# Patient Record
Sex: FEMALE | Race: WHITE | NOT HISPANIC OR LATINO | URBAN - METROPOLITAN AREA
[De-identification: names, ages, dates, MRNs, and addresses within clinical notes are randomized per-mention and may not be internally consistent; named-entity substitution may affect disease eponyms.]

---

## 2023-05-12 LAB
CLUE CELLS: NORMAL
NUGENT SCORE: 2
YEAST: NORMAL

## 2023-07-15 LAB
CLUE CELLS: NORMAL
NUGENT SCORE: 3
YEAST: NORMAL

## 2023-08-30 LAB
ERYTHROCYTE DISTRIBUTION WIDTH (RATIO) BY AUTOMATED COUNT: 12 % (ref 11.5–14.5)
ERYTHROCYTE MEAN CORPUSCULAR HEMOGLOBIN CONCENTRATION (G/DL) BY AUTOMATED: 32.5 G/DL (ref 32–36)
ERYTHROCYTE MEAN CORPUSCULAR VOLUME (FL) BY AUTOMATED COUNT: 94 FL (ref 80–100)
ERYTHROCYTES (10*6/UL) IN BLOOD BY AUTOMATED COUNT: 4.18 X10E12/L (ref 4–5.2)
HEMATOCRIT (%) IN BLOOD BY AUTOMATED COUNT: 39.1 % (ref 36–46)
HEMOGLOBIN (G/DL) IN BLOOD: 12.7 G/DL (ref 12–16)
LEUKOCYTES (10*3/UL) IN BLOOD BY AUTOMATED COUNT: 8.4 X10E9/L (ref 4.4–11.3)
PLATELETS (10*3/UL) IN BLOOD AUTOMATED COUNT: 250 X10E9/L (ref 150–450)

## 2023-08-31 LAB
CHLAMYDIA TRACH., AMPLIFIED: NEGATIVE
N. GONORRHEA, AMPLIFIED: NEGATIVE

## 2023-09-26 LAB
DEAMIDATED GLIADIN PEPTIDE IGA: 11 U/ML (ref 0–14)
DEAMIDATED GLIADIN PEPTIDE IGG: 46 U/ML (ref 0–14)
TISSUE TRANSGLUTAMINASE IGG: 6 U/ML (ref 0–14)
TISSUE TRANSGLUTAMINASE, IGA: 5 U/ML (ref 0–14)

## 2023-10-04 NOTE — H&P (VIEW-ONLY)
Diagnoses/Problems  Assessed    · Adult celiac disease (579.0) (K90.0)    Orders  Adult celiac disease    · DEAMIDATED GLIADIN PEPTIDE, IGA WITH ASSESSMENT OF TOTAL IgA;  Status:Active; Requested for:[id number];    Perform:Lab Services - Lab To Draw (Blood Test); Due:54Rjb9226;Ordered;  For:Adult celiac disease; Ordered By:Benjamin Elmore;   · Deamidated Gliadin Peptide, IgG; Status:Active; Requested for:02Kvp6875;    Perform:Lab Services - Lab To Draw (Blood Test); Due:63Bhw8583;Ordered;  For:Adult celiac disease; Ordered By:Benjamin Elmore;   · Endoscopy - Upper GI; Status:Hold For - Scheduling; Requested for:81Rpx0702;    Perform:[facility]; Due:30Lsk2290;Ordered;  For:Adult celiac disease; Ordered By:Benjamin Elmore;  competent to provide consent? : Yes-pt mentally competent to provide consent   · TISSUE TRANSGLUTAMINIASE AB, IGA WITH ASSESSMENT OF TOTAL IgA;  Status:Active; Requested for:[id number];    Perform:Lab Services - Lab To Draw (Blood Test); Due:15Pdo0922;Ordered;  For:Adult celiac disease; Ordered By:Benjamin Elmore;   · TISSUE TRANSGLUTAMINIASE AB, IGG; Status:Active; Requested for:40Uky8130;    Perform:Lab Services - Lab To Draw (Blood Test); Due:60Swc8159;Ordered;  For:Adult celiac disease; Ordered By:Benjamin Elmore;    Patient Discussion/Summary      I have ordered labs and an upper endoscopy to confirm the diagnosis of celiac disease. You should not alter your diet before these tests are done. If we confirm the diagnosis of celiac disease then I will recommend that you follow a gluten free diet and see a nutritionist to help with that dietary change.      You have been scheduled for an upper endoscopy (EGD). You were given instructions for preparing for this test in the office today. If you have questions about these instructions, please call my office at [phone].    After your procedure, you can expect me to talk to you to go over the results of the procedure. If polyps  were removed I will usually be able to tell you my initial thoughts about those polyps and give you some idea of when you should have another colonoscopy.      You were also given information regarding the schedule for your procedure including the time that you need to arrive to the endoscopy unit. You will also be contacted 2-3 day prior to your procedure to confirm the final arrival time. If you have questions about this or if you need to cancel or change this appointment please call my office at [phone].      Follow up in 3 months.          Provider Impressions      Klaudia Samuels is a 28 year old female with a history of depression/anxiety and ADHD who presents for consultation requested by her PCP (Zulay Mauro) for the evaluation of celiac disease.      1. Celiac disease  Labs reportedly concerning for celiac disease, but those results are not available to me at this time. Will need to repeat labs to establish a baseline and will also need EGD with biopsies to confirm the diagnosis. Recommend no dietary changes until diagnosis has been established. Once the diagnosis has been confirmed then will recommend a gluten free diet and follow up with nutrition.  - labs ordered  - EGD scheduled      Follow up in 3 months.         Chief Complaint    Celiac disease      History of Present Illness  Klaudia Samuels is a 28 year old female with a history of depression/anxiety and ADHD who presents for consultation requested by her PCP (Zulay Mauro) for the evaluation of celiac disease.    There are no celiac serologies in the  [facility]. Most recent labs include a normal CBC, normal TSH, and low vitamin D. No endoscopy reports in the  [facility].      She says that 2 months ago she had blood tests done which showed a positive celiac antibody. She is not sure which antibody was checked, but says that it was very elevated. She has not had any previous testing for celiac disease. Since that test was done she has been avoiding  gluten, but still thinks that she has had some exposure. She has noticed that when she eats gluten she will have increased diarrhea/abdominal discomfort as well as acne and other skin lesions. She also reports fatigue.      Patient denies any heartburn/GERD, N/V, dysphagia, odynophagia, constipation, hematemesis, hematochezia, or melena.    She says that her mother, maternal aunt, and maternal uncle have been diagnosed with celiac disease. She otherwise denies any family history of GI disease including specifically denying IBD, colorectal cancer, esophageal cancer, and gastric cancer.           Review of Systems    Constitutional: feeling tired and recent weight loss, but no fever and no chills.   Eyes: no yellow sclera/jaundice.   ENT: no lymphadenopathy.   Cardiovascular: no shortness of breath, no chest pain, no shortness of breath during exertion and no extremity edema.   Respiratory: no cough and no wheezing.   Gastrointestinal: as noted in HPI.   Musculoskeletal: no joint swelling, no arthralgias and no myalgias.   Integumentary: +acne, but no rashes, was no jaundiced and no itching.   Neurological: no headache, no convulsions, no dizziness and no fainting.   Psychiatric: no anxiety and no depression.   Hematologic/Lymphatic: no tendency for easy bleeding, no tendency for easy bruising, no swollen glands and no swollen glands in the neck.   All other systems have been reviewed and are negative for complaint.      Active Problems  Problems    · 25 weeks gestation of pregnancy (V22.2) (Z3A.25)   · Abnormal Pap smear of cervix (795.00) (R87.619)   · Acute pharyngitis, unspecified etiology (462) (J02.9)   · ADHD (attention deficit hyperactivity disorder), combined type (314.01) (F90.2)   ·  screening encounter (V28.9) (Z36.9)   · Bacterial vaginosis (616.10,041.9) (N76.0,B96.89)   · Body aches (780.96) (R52)   · Cervical cancer screening (V76.2) (Z12.4)   · Depression with anxiety (300.4) (F41.8)   ·  Difficulty of mother performing breastfeeding (V24.1) (Z39.1)   · Dyspareunia (625.0)   · Dysuria (788.1) (R30.0)   · Encounter for  screening for nuchal translucency (V28.89) (Z36.82)   · Encounter for contraceptive management (V25.9) (Z30.9)   · Encounter for immunization (V03.89) (Z23)   · Encounter for insertion of mirena IUD (V25.11) (Z30.430)   · Encounter for well woman exam with routine gynecological exam (V72.31) (Z01.419)   · Fatigue, unspecified type (780.79) (R53.83)   · First trimester pregnancy (V22.2) (Z34.91)   · Generalized body aches (780.96) (R52)   · Headache (784.0) (R51.9)   · Heartburn in pregnancy (646.80,787.1) (O26.899,R12)   · Hemorrhoids (455.6) (K64.9)   · IUD (intrauterine device) in place (V45.51) (Z97.5)   · Low back pain (724.2) (M54.50)   · Maternal drug use complicating pregnancy, antepartum (648.43,305.90) (O99.320)   · Pelvic pain in female (625.9) (R10.2)   · Postpartum exam (V24.2) (Z39.2)   · Pregnancy with inconclusive fetal viability (V23.87) (O36.80X0)   · Rh negative, antepartum (646.83) (O26.899,Z67.91)   · Rubella non-immune status, antepartum (646.83,V15.83) (O09.899,Z28.39)   · Screen for STD (sexually transmitted disease) (V74.5) (Z11.3)   · Sinus congestion (478.19) (R09.81)   · Sore throat (462) (J02.9)   · Suspected COVID-19 virus infection (V01.79) (Z20.822)   · Suspected fetal anomaly not found (V89.03) (Z03.73)   · Tetrahydrocannabinol (THC) dependence (304.30) (F12.20)   · Tightness in chest (786.59) (R07.89)   · Vaginal discharge (623.5) (N89.8)   · Vaginal irritation (623.9) (N89.8)   · Vitamin D deficiency (268.9) (E55.9)    Past Medical History  Problems    · No pertinent past medical history (V49.89) (Z78.9)    Surgical History  Problems    · History of [place] tooth extraction    Family History  Mother    · Family history of Adult celiac disease   · Family history of asthma (V17.5) (Z82.5)   · Family history of cardiac disorder (V17.49)  (Z82.49)  Maternal Aunt    · Family history of Adult celiac disease   · Family history of malignant neoplasm of breast (V16.3) (Z80.3)    Social History  Problems    · Caffeine use (V49.89) (Z78.9)   · Coffee   · Employed   · Exercises rarely (V49.89) (Z78.9)   · Feels safe at home   · Former consumption of alcohol (V11.3) (Z87.898)   · Former smoker (V15.82) (Z87.891)   · History of illicit drug use (305.90) (F19.91)   · IUD (intrauterine device) in place (V45.51) (Z97.5)   ·  not needed   · Marijuana   · History of Never a smoker   · History of No alcohol use   · History of No drug use   · History of Non-smoker (V49.89) (Z78.9)   · Sexually active   · Single    Allergies  Medication    · Cefzil TABS   Recorded By: More Sandoval; 5/16/2014 7:44:28 AM   · Penicillins   Recorded By: Bettye Beltrán; 2/15/2018 10:20:25 AM    Current Meds    Medication Name Instruction   Adderall 20 MG Oral Tablet    Mirena (52 MG) 20 MCG/24HR IUD placed : 2020   Vitamin B-12 100 MCG Oral Tablet      Vitals  Vital Signs    Recorded: 22Sep2023 11:40AM   Heart Rate 88   Systolic 115   Diastolic 72   Height 5 ft 2 in   Weight 124 lb    BMI Calculated 22.68 kg/m2   BSA Calculated 1.56     Physical Exam    Constitutional   General appearance: In no acute distress . thin.   Eyes   Anicteric Sclerae .   Ears, Nose, Mouth, and Throat   Oropharynx without lesions.    Neck   Supple, no lymphadenopathy.    Pulmonary   Auscultation of lungs: Clear.     Respiratory effort: No increased work of breathing or signs of respiratory distress.    Cardiovascular   Auscultation of heart: RRR without murmur.     Examination of extremities for edema: Normal.    Abdomen   Soft, non-tender.     Bowel sounds normal.   Musculoskeletal   Gait and station: Normal.     Digits and nails: Normal without clubbing or cyanosis.    Skin   No specific lesions, no spider angiomata or palmar erythema.    Psychiatric   patient alert. judgement was  appropriate. insight appropriate.    Orientation to person, place, and time: Normal.     Mood and affect: Normal.       Signatures   Electronically signed by : Benjamin Elmore MD; Sep 22 2023 12:32PM EST (Author)

## 2023-10-11 ENCOUNTER — PREP FOR PROCEDURE (OUTPATIENT)
Dept: GASTROENTEROLOGY | Facility: CLINIC | Age: 28
End: 2023-10-11
Payer: MEDICAID

## 2023-10-11 RX ORDER — SODIUM CHLORIDE 9 MG/ML
20 INJECTION, SOLUTION INTRAVENOUS CONTINUOUS
Status: CANCELLED | OUTPATIENT
Start: 2023-10-11

## 2023-10-13 PROBLEM — R09.81 SINUS CONGESTION: Status: ACTIVE | Noted: 2023-10-13

## 2023-10-13 PROBLEM — O36.80X0 PREGNANCY WITH INCONCLUSIVE FETAL VIABILITY (HHS-HCC): Status: ACTIVE | Noted: 2023-10-13

## 2023-10-13 PROBLEM — Z67.91 RH NEGATIVE, ANTEPARTUM (HHS-HCC): Status: ACTIVE | Noted: 2023-10-13

## 2023-10-13 PROBLEM — R87.619 ABNORMAL PAP SMEAR OF CERVIX: Status: ACTIVE | Noted: 2023-10-13

## 2023-10-13 PROBLEM — R30.0 DYSURIA: Status: ACTIVE | Noted: 2023-10-13

## 2023-10-13 PROBLEM — F41.8 DEPRESSION WITH ANXIETY: Status: ACTIVE | Noted: 2023-10-13

## 2023-10-13 PROBLEM — F12.20 TETRAHYDROCANNABINOL (THC) DEPENDENCE (MULTI): Status: ACTIVE | Noted: 2023-10-13

## 2023-10-13 PROBLEM — N94.10 DYSPAREUNIA IN FEMALE: Status: ACTIVE | Noted: 2023-10-13

## 2023-10-13 PROBLEM — K90.0 ADULT CELIAC DISEASE (HHS-HCC): Status: ACTIVE | Noted: 2023-10-13

## 2023-10-13 PROBLEM — R53.83 FATIGUE: Status: ACTIVE | Noted: 2023-10-13

## 2023-10-13 PROBLEM — Z97.5 IUD (INTRAUTERINE DEVICE) IN PLACE: Status: ACTIVE | Noted: 2023-10-13

## 2023-10-13 PROBLEM — R51.9 HEADACHE: Status: ACTIVE | Noted: 2023-10-13

## 2023-10-13 PROBLEM — O09.899 RUBELLA NON-IMMUNE STATUS, ANTEPARTUM (HHS-HCC): Status: ACTIVE | Noted: 2023-10-13

## 2023-10-13 PROBLEM — B96.89 BACTERIAL VAGINOSIS: Status: ACTIVE | Noted: 2023-10-13

## 2023-10-13 PROBLEM — O26.899 HEARTBURN IN PREGNANCY (HHS-HCC): Status: ACTIVE | Noted: 2023-10-13

## 2023-10-13 PROBLEM — R10.2 PELVIC PAIN IN FEMALE: Status: ACTIVE | Noted: 2023-10-13

## 2023-10-13 PROBLEM — R12 HEARTBURN IN PREGNANCY (HHS-HCC): Status: ACTIVE | Noted: 2023-10-13

## 2023-10-13 PROBLEM — R07.89 TIGHTNESS IN CHEST: Status: ACTIVE | Noted: 2023-10-13

## 2023-10-13 PROBLEM — F90.2 ADHD (ATTENTION DEFICIT HYPERACTIVITY DISORDER), COMBINED TYPE: Status: ACTIVE | Noted: 2023-10-13

## 2023-10-13 PROBLEM — K64.9 HEMORRHOIDS: Status: ACTIVE | Noted: 2023-10-13

## 2023-10-13 PROBLEM — N76.0 BACTERIAL VAGINOSIS: Status: ACTIVE | Noted: 2023-10-13

## 2023-10-13 PROBLEM — N89.8 VAGINAL IRRITATION: Status: ACTIVE | Noted: 2023-10-13

## 2023-10-13 PROBLEM — E55.9 VITAMIN D DEFICIENCY: Status: ACTIVE | Noted: 2023-10-13

## 2023-10-13 PROBLEM — R52 GENERALIZED PAIN: Status: ACTIVE | Noted: 2023-10-13

## 2023-10-13 PROBLEM — N89.8 VAGINAL DISCHARGE: Status: ACTIVE | Noted: 2023-10-13

## 2023-10-13 PROBLEM — M54.50 LOW BACK PAIN: Status: ACTIVE | Noted: 2023-10-13

## 2023-10-13 PROBLEM — Z28.39 RUBELLA NON-IMMUNE STATUS, ANTEPARTUM (HHS-HCC): Status: ACTIVE | Noted: 2023-10-13

## 2023-10-13 PROBLEM — O99.320: Status: ACTIVE | Noted: 2023-10-13

## 2023-10-13 PROBLEM — O26.899 RH NEGATIVE, ANTEPARTUM (HHS-HCC): Status: ACTIVE | Noted: 2023-10-13

## 2023-10-13 RX ORDER — WITCH HAZEL 5 G/1
CLOTH TOPICAL
COMMUNITY
Start: 2022-11-03

## 2023-10-13 RX ORDER — DEXTROAMPHETAMINE SACCHARATE, AMPHETAMINE ASPARTATE, DEXTROAMPHETAMINE SULFATE AND AMPHETAMINE SULFATE 5; 5; 5; 5 MG/1; MG/1; MG/1; MG/1
TABLET ORAL
COMMUNITY

## 2023-10-13 RX ORDER — AMOXICILLIN 500 MG/1
CAPSULE ORAL
COMMUNITY
Start: 2023-05-12 | End: 2023-10-16 | Stop reason: ALTCHOICE

## 2023-10-13 RX ORDER — FLUCONAZOLE 150 MG/1
TABLET ORAL
COMMUNITY
Start: 2023-03-28 | End: 2023-10-16 | Stop reason: ALTCHOICE

## 2023-10-13 RX ORDER — FAMOTIDINE 20 MG/1
1 TABLET, FILM COATED ORAL EVERY 12 HOURS
COMMUNITY
Start: 2020-08-26 | End: 2023-11-27 | Stop reason: SDUPTHER

## 2023-10-13 RX ORDER — PAROXETINE 10 MG/1
TABLET, FILM COATED ORAL
COMMUNITY
Start: 2022-11-29

## 2023-10-13 RX ORDER — HYDROCORTISONE 25 MG/G
CREAM TOPICAL
COMMUNITY
Start: 2022-11-04

## 2023-10-13 RX ORDER — LEVONORGESTREL 52 MG/1
INTRAUTERINE DEVICE INTRAUTERINE
COMMUNITY

## 2023-10-13 RX ORDER — DEXTROAMPHETAMINE SACCHARATE, AMPHETAMINE ASPARTATE MONOHYDRATE, DEXTROAMPHETAMINE SULFATE AND AMPHETAMINE SULFATE 7.5; 7.5; 7.5; 7.5 MG/1; MG/1; MG/1; MG/1
CAPSULE, EXTENDED RELEASE ORAL
COMMUNITY
Start: 2023-09-18

## 2023-10-13 RX ORDER — LISDEXAMFETAMINE DIMESYLATE 20 MG/1
20 CAPSULE ORAL DAILY
COMMUNITY
Start: 2023-06-20

## 2023-10-13 RX ORDER — LISDEXAMFETAMINE DIMESYLATE 30 MG/1
30 CAPSULE ORAL EVERY MORNING
COMMUNITY
Start: 2023-07-17

## 2023-10-13 RX ORDER — HYDROCORTISONE ACETATE 25 MG/1
SUPPOSITORY RECTAL
COMMUNITY
Start: 2022-11-04

## 2023-10-13 RX ORDER — BUPROPION HYDROCHLORIDE 100 MG/1
1 TABLET ORAL DAILY
COMMUNITY

## 2023-10-13 RX ORDER — PNV NO.95/FERROUS FUM/FOLIC AC 28MG-0.8MG
TABLET ORAL
COMMUNITY

## 2023-10-13 RX ORDER — AMOXICILLIN AND CLAVULANATE POTASSIUM 875; 125 MG/1; MG/1
TABLET, FILM COATED ORAL
COMMUNITY
Start: 2023-04-12 | End: 2023-10-16 | Stop reason: ALTCHOICE

## 2023-10-13 RX ORDER — DEXMETHYLPHENIDATE HYDROCHLORIDE 30 MG/1
30 CAPSULE, EXTENDED RELEASE ORAL DAILY
COMMUNITY
Start: 2023-06-06

## 2023-10-13 RX ORDER — DEXTROAMPHETAMINE SACCHARATE, AMPHETAMINE ASPARTATE MONOHYDRATE, DEXTROAMPHETAMINE SULFATE AND AMPHETAMINE SULFATE 2.5; 2.5; 2.5; 2.5 MG/1; MG/1; MG/1; MG/1
CAPSULE, EXTENDED RELEASE ORAL
COMMUNITY
Start: 2023-06-14

## 2023-10-16 ENCOUNTER — ANESTHESIA EVENT (OUTPATIENT)
Dept: GASTROENTEROLOGY | Facility: HOSPITAL | Age: 28
End: 2023-10-16
Payer: MEDICAID

## 2023-10-16 ENCOUNTER — HOSPITAL ENCOUNTER (OUTPATIENT)
Dept: GASTROENTEROLOGY | Facility: HOSPITAL | Age: 28
Discharge: HOME | End: 2023-10-16
Payer: MEDICAID

## 2023-10-16 ENCOUNTER — ANESTHESIA (OUTPATIENT)
Dept: GASTROENTEROLOGY | Facility: HOSPITAL | Age: 28
End: 2023-10-16
Payer: MEDICAID

## 2023-10-16 VITALS
SYSTOLIC BLOOD PRESSURE: 100 MMHG | BODY MASS INDEX: 24.55 KG/M2 | TEMPERATURE: 98 F | RESPIRATION RATE: 16 BRPM | HEIGHT: 61 IN | WEIGHT: 130 LBS | OXYGEN SATURATION: 100 % | DIASTOLIC BLOOD PRESSURE: 61 MMHG | HEART RATE: 82 BPM

## 2023-10-16 DIAGNOSIS — K21.9 GASTRO-ESOPHAGEAL REFLUX DISEASE WITHOUT ESOPHAGITIS: ICD-10-CM

## 2023-10-16 LAB — PREGNANCY TEST URINE, POC: NEGATIVE

## 2023-10-16 PROCEDURE — 3700000001 HC GENERAL ANESTHESIA TIME - INITIAL BASE CHARGE: Performed by: INTERNAL MEDICINE

## 2023-10-16 PROCEDURE — 88305 TISSUE EXAM BY PATHOLOGIST: CPT | Mod: TC | Performed by: INTERNAL MEDICINE

## 2023-10-16 PROCEDURE — 7100000010 HC PHASE TWO TIME - EACH INCREMENTAL 1 MINUTE: Performed by: INTERNAL MEDICINE

## 2023-10-16 PROCEDURE — 2500000004 HC RX 250 GENERAL PHARMACY W/ HCPCS (ALT 636 FOR OP/ED): Performed by: NURSE ANESTHETIST, CERTIFIED REGISTERED

## 2023-10-16 PROCEDURE — 7100000009 HC PHASE TWO TIME - INITIAL BASE CHARGE: Performed by: INTERNAL MEDICINE

## 2023-10-16 PROCEDURE — 2500000005 HC RX 250 GENERAL PHARMACY W/O HCPCS: Performed by: NURSE ANESTHETIST, CERTIFIED REGISTERED

## 2023-10-16 PROCEDURE — 2500000004 HC RX 250 GENERAL PHARMACY W/ HCPCS (ALT 636 FOR OP/ED): Performed by: INTERNAL MEDICINE

## 2023-10-16 PROCEDURE — 88305 TISSUE EXAM BY PATHOLOGIST: CPT | Performed by: PATHOLOGY

## 2023-10-16 PROCEDURE — 88305 TISSUE EXAM BY PATHOLOGIST: CPT | Mod: TC,SUR | Performed by: INTERNAL MEDICINE

## 2023-10-16 PROCEDURE — 43239 EGD BIOPSY SINGLE/MULTIPLE: CPT | Performed by: INTERNAL MEDICINE

## 2023-10-16 PROCEDURE — 3700000002 HC GENERAL ANESTHESIA TIME - EACH INCREMENTAL 1 MINUTE: Performed by: INTERNAL MEDICINE

## 2023-10-16 RX ORDER — LIDOCAINE HYDROCHLORIDE 20 MG/ML
INJECTION, SOLUTION EPIDURAL; INFILTRATION; INTRACAUDAL; PERINEURAL AS NEEDED
Status: DISCONTINUED | OUTPATIENT
Start: 2023-10-16 | End: 2023-10-16

## 2023-10-16 RX ORDER — PROPOFOL 10 MG/ML
INJECTION, EMULSION INTRAVENOUS AS NEEDED
Status: DISCONTINUED | OUTPATIENT
Start: 2023-10-16 | End: 2023-10-16

## 2023-10-16 RX ORDER — SODIUM CHLORIDE 9 MG/ML
20 INJECTION, SOLUTION INTRAVENOUS CONTINUOUS
Status: DISCONTINUED | OUTPATIENT
Start: 2023-10-16 | End: 2023-10-20 | Stop reason: HOSPADM

## 2023-10-16 RX ORDER — FENTANYL CITRATE 50 UG/ML
INJECTION, SOLUTION INTRAMUSCULAR; INTRAVENOUS AS NEEDED
Status: DISCONTINUED | OUTPATIENT
Start: 2023-10-16 | End: 2023-10-16

## 2023-10-16 RX ADMIN — PROPOFOL 50 MG: 10 INJECTION, EMULSION INTRAVENOUS at 09:50

## 2023-10-16 RX ADMIN — PROPOFOL 100 MG: 10 INJECTION, EMULSION INTRAVENOUS at 09:49

## 2023-10-16 RX ADMIN — SODIUM CHLORIDE: 9 INJECTION, SOLUTION INTRAVENOUS at 09:34

## 2023-10-16 RX ADMIN — LIDOCAINE HYDROCHLORIDE 40 MG: 20 INJECTION, SOLUTION EPIDURAL; INFILTRATION; INTRACAUDAL; PERINEURAL at 09:49

## 2023-10-16 RX ADMIN — FENTANYL CITRATE 25 MCG: 50 INJECTION, SOLUTION INTRAMUSCULAR; INTRAVENOUS at 09:50

## 2023-10-16 RX ADMIN — PROPOFOL 50 MG: 10 INJECTION, EMULSION INTRAVENOUS at 09:53

## 2023-10-16 RX ADMIN — PROPOFOL 50 MG: 10 INJECTION, EMULSION INTRAVENOUS at 09:59

## 2023-10-16 RX ADMIN — PROPOFOL 50 MG: 10 INJECTION, EMULSION INTRAVENOUS at 09:56

## 2023-10-16 RX ADMIN — FENTANYL CITRATE 50 MCG: 50 INJECTION, SOLUTION INTRAMUSCULAR; INTRAVENOUS at 09:49

## 2023-10-16 RX ADMIN — FENTANYL CITRATE 25 MCG: 50 INJECTION, SOLUTION INTRAMUSCULAR; INTRAVENOUS at 09:53

## 2023-10-16 SDOH — HEALTH STABILITY: MENTAL HEALTH: CURRENT SMOKER: 1

## 2023-10-16 ASSESSMENT — PAIN SCALES - GENERAL
PAINLEVEL_OUTOF10: 0 - NO PAIN
PAIN_LEVEL: 0
PAINLEVEL_OUTOF10: 0 - NO PAIN

## 2023-10-16 ASSESSMENT — COLUMBIA-SUICIDE SEVERITY RATING SCALE - C-SSRS
1. IN THE PAST MONTH, HAVE YOU WISHED YOU WERE DEAD OR WISHED YOU COULD GO TO SLEEP AND NOT WAKE UP?: NO
6. HAVE YOU EVER DONE ANYTHING, STARTED TO DO ANYTHING, OR PREPARED TO DO ANYTHING TO END YOUR LIFE?: NO
2. HAVE YOU ACTUALLY HAD ANY THOUGHTS OF KILLING YOURSELF?: NO

## 2023-10-16 ASSESSMENT — PAIN - FUNCTIONAL ASSESSMENT: PAIN_FUNCTIONAL_ASSESSMENT: 0-10

## 2023-10-16 NOTE — ANESTHESIA POSTPROCEDURE EVALUATION
Patient: Kluadia Samuels    Procedure Summary       Date: 10/16/23 Room / Location: [facility]    Anesthesia Start: 0941 Anesthesia Stop: 1008    Procedure: EGD Diagnosis: Gastro-esophageal reflux disease without esophagitis    Scheduled Providers: Benjamin Elmore MD Responsible Provider: OLGA Beaulieu    Anesthesia Type: MAC ASA Status: 2            Anesthesia Type: MAC    Vitals Value Taken Time   /54 10/16/23 1008   Temp 98.6 10/16/23 1008   Pulse 99 10/16/23 1008   Resp 16 10/16/23 1008   SpO2 98 10/16/23 1008       Anesthesia Post Evaluation    Patient location during evaluation: bedside  Patient participation: complete - patient participated  Level of consciousness: awake and alert  Pain score: 0  Pain management: adequate  Airway patency: patent  Cardiovascular status: acceptable  Respiratory status: acceptable  Hydration status: acceptable        No notable events documented.     no

## 2023-10-16 NOTE — ANESTHESIA PREPROCEDURE EVALUATION
Patient: Klaudia Samuels    Procedure Information       Date/Time: 10/16/23 1030    Scheduled providers: Benjamin Elmore MD    Procedure: EGD    Location: [facility]            Relevant Problems   Anesthesia (within normal limits)      Cardiovascular (within normal limits)      Endocrine (within normal limits)      GI (within normal limits)      /Renal (within normal limits)      Neuro/Psych   (+) ADHD (attention deficit hyperactivity disorder), combined type   (+) Depression with anxiety      Pulmonary (within normal limits)      GI/Hepatic (within normal limits)      Infectious Disease   (+) Bacterial vaginosis       Clinical information reviewed:    Allergies  Meds               NPO Detail:  No data recorded     Physical Exam    Airway  Mallampati: II  TM distance: >3 FB     Cardiovascular   Rhythm: regular  Rate: normal     Dental    Pulmonary - normal exam  Breath sounds clear to auscultation     Abdominal - normal exam             Anesthesia Plan    ASA 2     MAC     The patient is a current smoker.  Patient was previously instructed to abstain from smoking on day of procedure.  Patient did not smoke on day of procedure.    Anesthetic plan and risks discussed with patient.  Use of blood products discussed with who consented to blood products.

## 2023-10-17 NOTE — ADDENDUM NOTE
Encounter addended by: Loren Aggarwal RN on: 10/17/2023 12:20 PM   Actions taken: Contacts section saved, Flowsheet accepted No

## 2023-10-19 ENCOUNTER — TELEPHONE (OUTPATIENT)
Dept: GASTROENTEROLOGY | Facility: CLINIC | Age: 28
End: 2023-10-19
Payer: MEDICAID

## 2023-10-19 NOTE — TELEPHONE ENCOUNTER
Called patient. No answer. Left message.    Per office staff message she has been having bloating, pain, and brain fog. Some symptoms may be related to celiac disease. Plan to discuss symptoms with patient further and if there are severe symptoms may advise additional evaluation/imaging in the ER to rule out complication from procedure.

## 2023-10-19 NOTE — TELEPHONE ENCOUNTER
Pt called in today in regards to recent EGD that was done on 10/16/2023. Pt states that she is still experiencing pain in her stomach and that he stomach still feels hard. States that she is also still feeling foggy brained. Pt was wondering if these were normal symptoms to still be feeling as of right now. Please advise.

## 2023-10-25 LAB
LABORATORY COMMENT REPORT: NORMAL
PATH REPORT.COMMENTS IMP SPEC: NORMAL
PATH REPORT.FINAL DX SPEC: NORMAL
PATH REPORT.GROSS SPEC: NORMAL
PATH REPORT.RELEVANT HX SPEC: NORMAL
PATH REPORT.TOTAL CANCER: NORMAL

## 2023-11-27 DIAGNOSIS — R12 HEART BURN: ICD-10-CM

## 2023-11-27 RX ORDER — FAMOTIDINE 20 MG/1
20 TABLET, FILM COATED ORAL EVERY 12 HOURS
Qty: 60 TABLET | Refills: 6 | Status: SHIPPED | OUTPATIENT
Start: 2023-11-27 | End: 2024-02-27 | Stop reason: SDUPTHER

## 2024-02-27 DIAGNOSIS — R12 HEART BURN: ICD-10-CM

## 2024-02-27 RX ORDER — FAMOTIDINE 20 MG/1
20 TABLET, FILM COATED ORAL EVERY 12 HOURS
Qty: 60 TABLET | Refills: 6 | Status: SHIPPED | OUTPATIENT
Start: 2024-02-27

## 2024-02-27 NOTE — TELEPHONE ENCOUNTER
Reviewing  EMR  Last Annual Exam: 07/14/2023  Negative Pap 2021  Annual scheduled 07/25/2024    Famotidine was started by  in 2020 due to heartburn with pregnancy.  Pt is established with a Gastroenterologist, Dominick Elmore MD

## 2024-07-25 ENCOUNTER — APPOINTMENT (OUTPATIENT)
Dept: OBSTETRICS AND GYNECOLOGY | Facility: CLINIC | Age: 29
End: 2024-07-25
Payer: MEDICAID

## 2024-07-25 VITALS
SYSTOLIC BLOOD PRESSURE: 110 MMHG | WEIGHT: 130 LBS | HEIGHT: 61 IN | BODY MASS INDEX: 24.55 KG/M2 | DIASTOLIC BLOOD PRESSURE: 80 MMHG

## 2024-07-25 DIAGNOSIS — N89.8 VAGINAL DISCHARGE: ICD-10-CM

## 2024-07-25 DIAGNOSIS — Z12.4 SCREENING FOR CERVICAL CANCER: ICD-10-CM

## 2024-07-25 DIAGNOSIS — Z01.419 ENCOUNTER FOR WELL WOMAN EXAM WITH ROUTINE GYNECOLOGICAL EXAM: Primary | ICD-10-CM

## 2024-07-25 DIAGNOSIS — Z11.51 SCREENING FOR HPV (HUMAN PAPILLOMAVIRUS): ICD-10-CM

## 2024-07-25 PROCEDURE — 3008F BODY MASS INDEX DOCD: CPT | Performed by: NURSE PRACTITIONER

## 2024-07-25 PROCEDURE — 88175 CYTOPATH C/V AUTO FLUID REDO: CPT

## 2024-07-25 PROCEDURE — 99395 PREV VISIT EST AGE 18-39: CPT | Performed by: NURSE PRACTITIONER

## 2024-07-25 PROCEDURE — 87205 SMEAR GRAM STAIN: CPT

## 2024-07-25 NOTE — PROGRESS NOTES
"     HPI:   Klaudia Samuels is a 29 y.o. who presents today for her annual gynecologic exam with complaints    She has the following concerns; having some vaginal discharge and odor. Feels that she gets BV frequently.     GYN HISTORY:  Periods are rare, lasting 1 days.   Dysmenorrhea:none. Cyclic symptoms include none.   No intermenstrual bleeding, spotting, or discharge.    Current contraception: IUD      Requests STD testing: no     PAP History   Last pap:   2021 Normal HPV Not done  History of abnormal pap: no   HPV vaccine: unknown.   @paphx@    Health Screening  Family history of breast, uterine, ovarian or colon cancer: yes - maternal aunt has a hx of breast cancer.       Colon cancer: due at age 47       The patient feels safe at home.         Review of Systems:   Constitutional: no fever and no chills.  Cardiovascular: no chest pain.   Respiratory: no shortness of breath.   Gastrointestinal: no nausea, no abdominal pain and no constipation  Genitourinary: no dysuria, no urinary incontinence, no vaginal dryness, no pelvic pain and no vaginal discharge.   Neurological: no headache.  Psychiatric: no anxiety and no depression.              Objective         /80   Ht 1.56 m (5' 1.42\")   Wt 59 kg (130 lb)   LMP 07/22/2024   BMI 24.23 kg/m²         Physical Exam:   Constitutional: Alert and in no acute distress. Well developed, well nourished.      Neck: No neck asymmetry. Supple. Thyroid not enlarged and there were no palpable thyroid nodules.      Cardiovascular: Heart rate and rhythm were normal, normal S1 and S2, no gallops, and no murmurs.      Pulmonary: No respiratory distress. Clear bilateral breath sounds.      Chest: Breasts: Normal appearance, no nipple discharge and no skin changes. Palpation of breasts and axillae: No palpable mass and no axillary lymphadenopathy.      Abdomen: Soft nontender; no abdominal mass palpated. Normal bowel sounds. No organomegaly.      Genitourinary:   - External " genitalia: Normal.   - Palpation of lymph nodes in groin: No inguinal lymphadenopathy.   - Bartholin's Urethral and Skenes Glands: Normal.   - Urethra: Normal.    -Bladder: Normal on palpation.   - Vagina: Normal.   - Cervix: Normal. + IUD strings noted on exam.   - Uterus: Normal. Right Adnexa/parametria: Normal. Left Adnexa/parametria: Normal.   - Perianal Area: Normal.      Skin: Normal skin color and pigmentation, normal skin turgor, and no rash     Psychiatric: Alert and oriented x 3. Affect normal to patient baseline. Mood: Appropriate.            Assessment/Plan       Diagnoses and all orders for this visit:  Encounter for well woman exam with routine gynecological exam  Klaudia is a shu patient who presents for well woman exam. She is doing well, though continues to have vaginal discharge and some odor. She has a Mirena for pregnancy prevention. PAP obtained today.   Vaginal discharge  -     Vaginitis Gram Stain For Bacterial Vaginosis + Yeast  Screening for HPV (human papillomavirus)  -     THINPREP PAP  Screening for cervical cancer  -     THINPREP PAP  Follow-up annually; sooner if needed or pending results of PAP/ gram stain.       LYNDA Knox-CNP

## 2024-07-28 LAB
CLUE CELLS VAG LPF-#/AREA: NORMAL /[LPF]
NUGENT SCORE: 2
YEAST VAG WET PREP-#/AREA: NORMAL

## 2024-08-05 LAB
CYTOLOGY CMNT CVX/VAG CYTO-IMP: NORMAL
LAB AP HPV GENOTYPE QUESTION: YES
LAB AP HPV HR: NORMAL
LABORATORY COMMENT REPORT: NORMAL
LMP START DATE: NORMAL
PATH REPORT.TOTAL CANCER: NORMAL

## 2025-04-03 ENCOUNTER — APPOINTMENT (OUTPATIENT)
Dept: OBSTETRICS AND GYNECOLOGY | Facility: CLINIC | Age: 30
End: 2025-04-03
Payer: MEDICAID

## 2025-04-03 DIAGNOSIS — Z11.3 SCREEN FOR STD (SEXUALLY TRANSMITTED DISEASE): ICD-10-CM

## 2025-04-03 DIAGNOSIS — N93.0 PCB (POST COITAL BLEEDING): ICD-10-CM

## 2025-04-04 ENCOUNTER — APPOINTMENT (OUTPATIENT)
Dept: OBSTETRICS AND GYNECOLOGY | Facility: CLINIC | Age: 30
End: 2025-04-04
Payer: MEDICAID

## 2025-04-07 ENCOUNTER — APPOINTMENT (OUTPATIENT)
Dept: OBSTETRICS AND GYNECOLOGY | Facility: CLINIC | Age: 30
End: 2025-04-07
Payer: MEDICAID

## 2025-04-09 DIAGNOSIS — R12 HEART BURN: ICD-10-CM

## 2025-04-09 RX ORDER — FAMOTIDINE 20 MG/1
20 TABLET, FILM COATED ORAL EVERY 12 HOURS
Qty: 60 TABLET | Refills: 6 | Status: SHIPPED | OUTPATIENT
Start: 2025-04-09

## 2025-05-22 ENCOUNTER — APPOINTMENT (OUTPATIENT)
Dept: OBSTETRICS AND GYNECOLOGY | Facility: CLINIC | Age: 30
End: 2025-05-22
Payer: MEDICAID

## 2025-06-06 ENCOUNTER — APPOINTMENT (OUTPATIENT)
Dept: OBSTETRICS AND GYNECOLOGY | Facility: CLINIC | Age: 30
End: 2025-06-06
Payer: MEDICAID

## 2025-06-17 ENCOUNTER — TELEPHONE (OUTPATIENT)
Dept: OBSTETRICS AND GYNECOLOGY | Facility: CLINIC | Age: 30
End: 2025-06-17
Payer: MEDICAID

## 2025-06-17 DIAGNOSIS — R12 HEART BURN: ICD-10-CM

## 2025-06-17 RX ORDER — FAMOTIDINE 20 MG/1
20 TABLET, FILM COATED ORAL EVERY 12 HOURS
Qty: 60 TABLET | Refills: 6 | Status: SHIPPED | OUTPATIENT
Start: 2025-06-17

## 2025-06-17 NOTE — TELEPHONE ENCOUNTER
Pt requesting refills on famotidine to new pharmacy ([facility])    Last annual 7/25/24   Upcoming annual 8/5/25

## 2025-08-05 ENCOUNTER — APPOINTMENT (OUTPATIENT)
Dept: OBSTETRICS AND GYNECOLOGY | Facility: CLINIC | Age: 30
End: 2025-08-05
Payer: MEDICAID

## 2025-08-05 VITALS — SYSTOLIC BLOOD PRESSURE: 110 MMHG | DIASTOLIC BLOOD PRESSURE: 70 MMHG | BODY MASS INDEX: 25.91 KG/M2 | WEIGHT: 139 LBS

## 2025-08-05 DIAGNOSIS — Z01.419 ENCOUNTER FOR WELL WOMAN EXAM WITH ROUTINE GYNECOLOGICAL EXAM: ICD-10-CM

## 2025-08-05 DIAGNOSIS — Z11.3 SCREEN FOR STD (SEXUALLY TRANSMITTED DISEASE): Primary | ICD-10-CM

## 2025-08-05 PROCEDURE — 99395 PREV VISIT EST AGE 18-39: CPT | Performed by: NURSE PRACTITIONER

## 2025-08-05 NOTE — PROGRESS NOTES
Chief Complaint   Patient presents with    Annual Exam     Reviewing  EMR  Last Annual Exam: 07/25/2024  Negative Pap 2024                 HPI:   Klaudia Samuels is a 30 y.o. who presents today for her annual gynecologic exam without complaints    She has the following concerns; none. She is doing well. Happy with her IUD. Periods are very infrequent.     GYN HISTORY:  Periods are rare, lasting 1 days.   Dysmenorrhea:none. Cyclic symptoms include none.   No intermenstrual bleeding, spotting, or discharge.    Current contraception: IUD      Requests STD testing: yes     PAP History   Last pap:   2024 Normal HPV Not done  History of abnormal pap: no   HPV vaccine: unknown.   @paphx@    Health Screening  Family history of breast, uterine, ovarian or colon cancer: yes - maternal aunt has a hx of breast cancer.      Colon cancer: due at age 47       The patient feels safe at home.         Review of Systems:   Constitutional: no fever and no chills.  Cardiovascular: no chest pain.   Respiratory: no shortness of breath.   Gastrointestinal: no nausea, no abdominal pain and no constipation  Genitourinary: no dysuria, no urinary incontinence, no vaginal dryness, no pelvic pain and no vaginal discharge.   Neurological: no headache.  Psychiatric: no anxiety and no depression.              Objective         /70   Wt 63 kg (139 lb)   LMP 07/08/2025 (Approximate)   BMI 25.91 kg/m²         Physical Exam:   Constitutional: Alert and in no acute distress. Well developed, well nourished.      Neck: No neck asymmetry. Supple. Thyroid not enlarged and there were no palpable thyroid nodules.      Cardiovascular: Heart rate and rhythm were normal, normal S1 and S2, no gallops, and no murmurs.      Pulmonary: No respiratory distress. Clear bilateral breath sounds.      Chest: Breasts: Normal appearance, no nipple discharge and no skin changes. Palpation of breasts and axillae: No palpable mass and no axillary lymphadenopathy.       Abdomen: Soft nontender; no abdominal mass palpated. Normal bowel sounds. No organomegaly.      Genitourinary:   - External genitalia: Normal.   - Palpation of lymph nodes in groin: No inguinal lymphadenopathy.   - Bartholin's Urethral and Skenes Glands: Normal.   - Urethra: Normal.    -Bladder: Normal on palpation.   - Vagina: Normal.   - Cervix: Normal. + IUD strings noted on exam.   - Uterus: Normal. Right Adnexa/parametria: Normal. Left Adnexa/parametria: Normal.   - Perianal Area: Normal.      Skin: Normal skin color and pigmentation, normal skin turgor, and no rash     Psychiatric: Alert and oriented x 3. Affect normal to patient baseline. Mood: Appropriate.          Assessment/Plan   Diagnoses and all orders for this visit:  Encounter for well woman exam with routine gynecological exam  Here for well woman exam. She is doing well! PAP is up to date. IUD strings noted on exam. STD testing obtained per pt request.   Screen for STD (sexually transmitted disease)  -     C. trachomatis / N. gonorrhoeae, Amplified, Urogenital  Follow-up annually; sooner if needed.         LYNDA Evans-CNP 08/05/25 3:57 PM

## 2025-08-06 LAB
C TRACH RRNA SPEC QL NAA+PROBE: NOT DETECTED
N GONORRHOEA RRNA SPEC QL NAA+PROBE: NOT DETECTED
QUEST GC CT AMPLIFIED (ALWAYS MESSAGE): NORMAL

## 2026-08-13 ENCOUNTER — APPOINTMENT (OUTPATIENT)
Dept: OBSTETRICS AND GYNECOLOGY | Facility: CLINIC | Age: 31
End: 2026-08-13
Payer: MEDICAID